# Patient Record
(demographics unavailable — no encounter records)

---

## 2018-01-04 NOTE — DIAGNOSTIC IMAGING REPORT
PROCEDURE: CHEST SINGLE (PORTABLE)

COMPARISON: None.

INDICATIONS: SHORTNESS OF BREATH

 

FINDINGS:



Lungs are well-inflated. No focal consolidation, pleural effusion, or 

pneumothorax. Cardiomediastinal contour and pulmonary vasculature are 

within normal limits when accounting for portable AP technique. No 

acute osseous abnormality.

 

CONCLUSION:

No acute cardiopulmonary abnormality. 

For description of bilateral pulmonary emboli refer to CT subsequently 

performed CT scan of the chest with contrast also from 1/4/2018. 

 

Dictated by:  Elton Ellis M.D. on 1/04/2018 at 11:22     

Electronically approved by:  Elton Ellis M.D. on 1/04/2018 at 11:22

## 2018-01-04 NOTE — DIAGNOSTIC IMAGING REPORT
PROCEDURE:

CT scan of the chest  WITH intravenous contrast, using pulmonary 

embolus protocol.

 

TECHNIQUE:

The chest was scanned utilizing a multidetector helical scanner from 

the lung apex through the level of the adrenal glands after the IV 

administration of 100 cc of Isovue 370.  Coronal and sagittal 

multiplanar reformations were obtained.

 

COMPARISON: None.

 

INDICATIONS:  r/o pe

    

FINDINGS: Examination is limited secondary to patient body habitus with 

beam hardening artifact secondary to multiple contact points with the 

scanning gantry.

 

Vasculature: Multiple filling defects extend from the branch point of 

the left main pulmonary artery into upper and lower lobe segmental 

branches. Similar findings are present in the upper, lower and middle 

lobe branches of the right pulmonary arterial tree. Pulmonary outflow 

tract is at the upper limits of normal in caliber. There is right 

ventricular dilatation.

 

There is no ectasia or aneurysmal dilatation of the thoracic aorta.

 

Lungs and Airways: No air space consolidation, bronchiectasis, or 

fibrotic changes.

 

Pleura: No pleural effusion or pneumothorax.

 

Heart and mediastinum: The visualized portions of the thyroid gland 

appear normal. No axillary, hilar, or mediastinal lymphadenopathy.

 

Soft tissues: Grossly unremarkable.

 

Abdomen: Visualized portions of the liver and spleen are unremarkable. 

Remainder of the upper abdomen is poorly evaluated.

 

Bones: No osseous destructive lesions. Multilevel degenerative disc 

changes of the thoracic spine.

 

IMPRESSION: 

 

Bilateral acute pulmonary emboli involving lobar and segmental branches 

of the left upper and lower lobes, and right upper, middle, and lower 

lobes. Associated right ventricular dilatation suggestive of right 

heart strain.

 

Findings were discussed with Dr. Mcclellan in the emergency center at 

11:10 AM 1/4/2018.

 

 

Dictated by:  Elton Ellis M.D. on 1/04/2018 at 11:21     

Electronically approved by:  Elton Ellis M.D. on 1/04/2018 at 11:21

## 2018-01-04 NOTE — CONSULTATION
DATE OF CONSULTATION:  January 04, 2018 



PULMONARY/CRITICAL CARE CONSULTATION 



PRIMARY CARE PHYSICIAN:  Dr. Paul Woods



REASON FOR CONSULTATION:  Acute pulmonary embolism.



CHIEF COMPLAINTS:  Shortness of breath. 



HPI:  Ms. Newton is a 47-year-old female.  She is a regular patient of Dr. Mcmillan.  She presented to the emergency room with worsening shortness of 

breath for the last 3 days.  She was getting short of breath on walking 

short distances in and around the house, which is something new for her.  

She went to see Dr. Mcmillan in the office where she became hypoxic, and she 

was transferred to the emergency room.  She reported episodes of dizziness, 

near syncope and numbness and tingling in her arm as well.  She denies any 

chest pain, nausea, vomiting, diarrhea or focal weakness.



REVIEW OF SYSTEMS

GENERAL:  Denies any fever or chills. 

HEENT:  Denies any head trauma or head injury.  

ENT:  Denies any earache, nosebleed, throat pain.  

CVS:  Denies any chest pain.  

RESPIRATORY:  As in HPI.

GI:  Denies any nausea or vomiting.  

OTHER:  The rest of the review of systems are negative except as in HPI.



PAST MEDICAL HISTORY:  Hypertension.



PAST SURGICAL HISTORY:  Knee replacement a year ago.



FAMILY AND SOCIAL HISTORY:  She is a lifelong nonsmoker.  Does not drink.  

Denies any family history of blood clots.



PHYSICAL EXAMINATION

VITAL SIGNS:  Temperature 98.6, pulse of 96, blood pressure 136/91, 

respiratory rate 18 to 20 per minute.  O2 sat is 96% on 4 L nasal cannula.  



SKIN:  Warm and dry. 

GENERAL APPEARANCE:  Morbidly obese, awake and alert, following commands.

HEENT:  Head is atraumatic and normocephalic.  Pupils are reactive.  

NECK:  Supple.  No JVD.  Thyroid not enlarged. 

CHEST:  Clear to auscultation bilaterally.  No wheezing.  No crackles. 

HEART:  S1, S2 audible. 

ABDOMEN:  Soft.  Nontender and nondistended. 

EXTREMITIES:  Trace pedal edema.  

NEUROLOGIC:  Awake and alert, following commands, responding to questions 

appropriately. 



CT OF THE CHEST:  I reviewed the films.  Patient has large pulmonary 

embolism, which is bilateral, involving the lobar branches and subsegmental 

branches.  



ECHOCARDIOGRAM:  RV strain.  



EKG:  S1Q3T3 pattern. 



LABS:  White count 9.7, hemoglobin 14.8, platelets 225.  Chemistries:  

Sodium 141, potassium 4.1, chloride 109, BUN 13, creatinine 0.8.  .  

Troponin is 0.250.



ASSESSMENT AND PLAN:  Roxane Newton is a 47-year-old female who presented 

with worsening shortness of breath.  CT is showing evidence of pulmonary 

embolism.  No recent travel.  No history of malignancy.  No recent trauma 

or hospitalization.  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  No family 

history of blood clots as well.



CURRENT PROBLEMS

1. Acute pulmonary embolism.

2. Acute hypoxic respiratory failure secondary to pulmonary embolism.

3. Right ventricular strain secondary to pulmonary embolism.



PLAN

1. I will continue the patient on heparin as started.  

2. At this point, the patient is hemodynamically stable.  Blood pressure 

and heart rate have been stable, so no indication for thrombolytics.  

However, respiratory status is precarious.  Recommend observing the 

patient in ICU.  Discussed with the ER physician, also discussed with 

Dr. Woods.



cc time 50 min



Thank you for this consult.  



 





DD:  01/04/2018 12:48

DT:  01/04/2018 14:20

Job#:  [id number] KACEY THIBODEAUX

## 2018-01-05 NOTE — DIAGNOSTIC IMAGING REPORT
EXAMINATION:  CHEST SINGLE (PORTABLE)    



INDICATION:           Pulmonary embolism 



COMPARISON:  None

     

FINDINGS:

TUBES and LINES:  None.



LUNGS:  Lungs are well inflated.  Lungs are clear.   There is no evidence of

pneumonia or pulmonary edema.



PLEURA:  No pleural effusion or pneumothorax.



HEART AND MEDIASTINUM:  The cardiomediastinal silhouette is unremarkable.    



BONES AND SOFT TISSUES:  No acute osseous lesion.  Soft tissues are

unremarkable.



UPPER ABDOMEN: No free air under the diaphragm.    



IMPRESSION: 

No acute thoracic abnormality.





Signed by: Dr. García Hull M.D. on 1/5/2018 7:04 AM

## 2018-01-12 NOTE — CONSULTATION
DATE OF CONSULTATION:  January 08, 2018



REQUESTING PHYSICIAN:  Dr. Warren Woods.



HISTORY OF PRESENT ILLNESS:  Roxane John is a 47-year-old white female 

who had an unprovoked pulmonary embolus, subsequently admitted for further 

evaluation, history of having had shortness of breath for approximately 3 

days prior to seeing Dr. Mcmillan, subsequently was found to be hypoxic, came 

to the emergency room and subsequently admitted for further evaluation and 

treatment.



PAST MEDICAL ILLNESSES:  History of hypertension.



SURGICAL HISTORY:  She has knee replacement a year back.



SOCIAL HISTORY:  Nonsmoker.



FAMILY HISTORY:  Noncontributory.



ALLERGIES:  REPORTED NONE.



MEDICATIONS

At this time:

1. Sodium chloride.

2. Lisinopril.

3. Weight-based heparin protocol.



REVIEW OF SYSTEMS

HEENT:  Normal.

CARDIAC:  History of hypertension.

RESPIRATORY:  Now pulmonary embolus.

GI:  Normal.

:  Normal.

MUSCULOSKELETAL:  History of knee replacement a year back.

NEUROENDOCRINE:  Essentially normal.



PHYSICAL EXAMINATION

GENERAL:  A morbidly obese female.

NECK:  No adenopathy.

HEART:  Within normal limits.

LUNGS:  Clear.

BREASTS:  Deferred.

ABDOMEN:  Obese.  There is no hepatosplenomegaly.

RECTAL:  Deferred.

VAGINAL:  Deferred.

CENTRAL NERVOUS SYSTEM:  Essentially normal.

EXTREMITIES:  Scar of the knee replacement.



LABORATORY DATA:  Shows a hemoglobin of 14.8, hematocrit 45, white count of 

9700, platelets of 225,000.  Chemistry:  Sodium of 141, potassium of 4.1, 

chloride 79, CO2 of 22, BUN 13, creatinine 0.8, glucose 119, calcium 9.7, 

bilirubin 0.5.  SGOT 13, SGPT 16, alkaline phosphatase 72.  BNP high at 

332.  Low total proteins of 6.1, low albumin of 3, globulin is 3.1.



IMAGING:  Confirmed CT of the chest on 01/04/2018 to show bilateral acute 

pulmonary emboli left upper lobe, lower lobe, right upper lobe, right 

middle lobe, and right lower lobe.



IMPRESSION

1. Pulmonary embolus, unprovoked.

2. Hypoproteinemia (6.1).

3. Hypoalbuminemia (3.1).

4. Hypertension.

5. History of endocervical polyp excised on 11/14/2016.

6. Morbid obesity.



PLAN, COMMENTS AND SUGGESTIONS

1. Suggest to add Coumadin.

2. Since this is unprovoked, at least the  literature is to keep 

these patients on "long-term" anticoagulation; however, the patient 

could be given 6 months of aggressive anticoagulation, keep the INR 

between 2 and 3, take the Coumadin off for 1 month, do a thorough 

thrombophilia profile.  If she does have thrombophilia, she does qualify 

for lifetime anticoagulation.



I certainly suggest long-term anticoagulation in this lady because this was 

an unprovoked massive pulmonary emboli.



Thank you very much for allowing me to participate in management of this 

patient.  I will follow this patient during this hospitalization.







DD:  01/12/2018 09:45

DT:  01/12/2018 10:25

Job#:  [id number]



cc:MD Ronald Gleason MD

## 2018-02-13 NOTE — DIAGNOSTIC IMAGING REPORT
#[id number] - MGSCRBIL

#BILATERAL FIRST EVER DIGITAL SCREENING MAMMOGRAM WITH CAD: 1/29/2018

CLINICAL: Routine screening.  Baseline exam.  



No prior exams were available for comparison.  Current study contains 8 films.  

The tissue of both breasts is predominantly fatty.  

Current study was also evaluated with a Computer Aided Detection (CAD) system.  

There are benign calcifications in the left breast.  

No significant masses, calcifications, or other findings are seen in either breast.  



IMPRESSION: BENIGN

There is no mammographic evidence of malignancy.  A 1 year screening mammogram is recommended. 

The patient will be notified by letter of the results.  





Guzman Monarchchanell regalado/hodan:2/12/2018 07:54:45  



Imaging Technologist: Tomasa MORGAN)(M), [facility]

letter sent: Normal Exam  

Mammogram BI-RADS: 2 Benign

## 2019-10-29 NOTE — NUR
bedside report received from Renny MILLER RN. Alert oriented and appropriate, PERRLA, respirations 
even and unlabored to room air. Pulses x4 extremities equal and strong. + neurovascular 
function to right hand present. TR band present. Cap fill brisk < 3 sec. 



Skin warm and dry integrity appears intact. IV 22g to left hand  presents healthy w/o s/s of 
infiltration or complaint. Abdomen soft and supple. pt offered toileting, denies need to 
urinate or defecate. No personal affects with patient.  Family at bedside. Pt and family 
verbalizes understanding of DC requirements and teaching. Placed on bedside monitor ACU 8.



Currently w/o complaint of pain or need. bed low and locked , side rails up x2 , call light 
within reach.

## 2019-10-29 NOTE — NUR
TR band successfully removed w/o incident. 7Fr sheath removed form Right AC . Discharge 
teaching performed and repeated back. VS wnl. No acute changes. from baseline

## 2019-10-29 NOTE — NUR
Pt meets DC criteria. right radial assessed for s/s of complication and presence of 
hematoma. Overall skin warm, dry, no discolor, and pulses present. IV removed from left 
hand. Distal tip appears intact. VS WNL. Pt denies pain, sob, or need at this time. Family 
at bedside. Review of discharge paperwork and follow up instructions. wrist brace reinforced 
removal next day with dressing. verbalized understanding. Pt to wheelchair and transported 
to front of hospital. Transferred to private vehicle under own strength w/o incident with DC 
paperwork in hand. - cgf

## 2019-10-29 NOTE — XMS REPORT
Patient Summary Document

                             Created on: 10/29/2019



JAVIER REY

External Reference #: [id number]

: 1970

Sex: Female



Demographics







                          Address                   [address] [place] [address]

 

                          Home Phone                [phone]

 

                          Preferred Language        Unknown

 

                          Marital Status            Unknown

 

                          Ep[employer]alian Affiliation     Unknown

 

                          Race                      Unknown

 

                                        Additional Race(s)  

 

                          Ethnic Group              Unknown





Author







                          Author                    [facility]

 

                          Address                   Unknown

 

                          Phone                     Unavailable







Care Team Providers







                    Care Team Member Name    Role                Phone

 

                    HERI FORTUNE       Unavailable         Unavailable

 

                    FLAVIA BARTLETT    Unavailable         Unavailable







Problems

This patient has no known problems.



Allergies, Adverse Reactions, Alerts

This patient has no known allergies or adverse reactions.



Medications

This patient has no known medications.



Results







           Test Description    Test Time    Test Comments    Text Results    Atomic Results    Result

 Comments

 

                MAMMOGRAPHY DIGITAL SCR BILAT                                        [place] [address]      Patient Name: 
JAVIER REY   MR #: [id number]    : 1970 Age/Sex: 47/F  Acct #: 
[id number] Req #: [id number]  Adm Physician:     Ordered by: HERI FORTUNE DO
 Report #: [id number]   Location: MAMMO  Room/Bed:     
_________________________________________________________________________
__________________________    Procedure: [id number] MG/MAMMOGRAPHY DIGITAL SCR 
BILAT  Exam Date: 18                            Exam Time: 1030       
REPORT STATUS: Signed       #[id number] - [id number]   #BILATERAL FIRST EVER 
DIGITAL SCREENING MAMMOGRAM WITH CAD: 2018   CLINICAL: Routine screening.  
Baseline exam.        No prior exams were available for comparison.  Current 
study contains 8 films.     The tissue of both breasts is predominantly fatty.  
  Current study was also evaluated with a Computer Aided Detection (CAD) system.
    There are benign calcifications in the left breast.     No significant 
masses, calcifications, or other findings are seen in either breast.        
IMPRESSION: BENIGN   There is no mammographic evidence of malignancy.  A 1 year 
screening mammogram is recommended.    The patient will be notified by letter of
the results.           Tom regalado/jesus:2018 
07:54:45        Imaging Technologist: Tomasa MORGAN)(ROSETTA), [facility]   letter sent: Normal Exam     Mammogram BI-RADS: 2 
Benign     Dictated By: TOM RAY DO  Electronically Signed By: TOM RAY DO on 18  Transcribed By: JESUS on 18       COPY 
TO:   HERI FORTUNE DO                     

 

                CHEST SINGLE (PORTABLE)                                        [place] [address]      Patient Name: 
JAVIER REY   MR #: [id number]    : 1970 Age/Sex: 47/F  Acct #: 
[id number] Req #: [id number]  Adm Physician: FLAVIA BARTLETT MD    Ordered by: 
WOJCIECH GARCIA NP  Report #: [id number]   Location: ICU  Room/Bed: ICU [facility]  
 ______________________________________________
_____________________________________________________    Procedure: [id number] 
DX/CHEST SINGLE (PORTABLE)  Exam Date: 18                            Exam 
Time: 0620       REPORT STATUS: Signed    EXAMINATION:  CHEST SINGLE (PORTABLE) 
        INDICATION:           Pulmonary embolism       COMPARISON:  None        
  FINDINGS:   TUBES and LINES:  None.      LUNGS:  Lungs are well inflated.  
Lungs are clear.   There is no evidence of   pneumonia or pulmonary edema.      
PLEURA:  No pleural effusion or pneumothorax.      HEART AND MEDIASTINUM:  The 
cardiomediastinal silhouette is unremarkable.          BONES AND SOFT TISSUES:  
No acute osseous lesion.  Soft tissues are   unremarkable.      UPPER ABDOMEN: 
No free air under the diaphragm.          IMPRESSION:    No acute thoracic 
abnormality.         Signed by: Dr. García Hull M.D. on 2018 7:04 AM       
Dictated By: GARCÍA HUDSON MD  Electronically Signed By: GARCÍA OBRIEN MD on 18  Transcribed By: SHANA on 18       COPY 
TO:   WOJCIECH GARCIA NP                

 

                CT CHEST W                                          [place] [address]      Patient Name: JAVIER REY   MR #:
[id number]    : 1970 Age/Sex: 47/F  Acct #: [id number] Req #: [id number]
[id number]  Adm Physician:     Ordered by: CONRADO GILMAN MD  Report #: [id number]  
Location: ER  Room/Bed:     
____________________________________________________________________________
_______________________    Procedure: [id number] CT/CT CHEST W  Exam Date: 
18                            Exam Time: 1040       REPORT STATUS: Signed 
  PROCEDURE:   CT scan of the chest  WITH intravenous contrast, using pulmonary 
  embolus protocol.       TECHNIQUE:   The chest was scanned utilizing a 
multidetector helical scanner from    the lung apex through the level of the 
adrenal glands after the IV    administration of 100 cc of Isovue 370.  Coronal 
and sagittal    multiplanar reformations were obtained.       COMPARISON: None. 
     INDICATIONS:  r/o pe          FINDINGS: Examination is limited secondary to
patient body habitus with    beam hardening artifact secondary to multiple 
contact points with the    scanning gantry.       Vasculature: Multiple filling 
defects extend from the branch point of    the left main pulmonary artery into 
upper and lower lobe segmental    branches. Similar findings are present in the 
upper, lower and middle    lobe branches of the right pulmonary arterial tree. 
Pulmonary outflow    tract is at the upper limits of normal in caliber. There is
right    ventricular dilatation.       There is no ectasia or aneurysmal 
dilatation of the thoracic aorta.       Lungs and Airways: No air space 
consolidation, bronchiectasis, or    fibrotic changes.       Pleura: No pleural 
effusion or pneumothorax.       Heart and mediastinum: The visualized portions 
of the thyroid gland    appear normal. No axillary, hilar, or mediastinal 
lymphadenopathy.       Soft tissues: Grossly unremarkable.       Abdomen: 
Visualized portions of the liver and spleen are unremarkable.    Remainder of 
the upper abdomen is poorly evaluated.       Bones: No osseous destructive 
lesions. Multilevel degenerative disc    changes of the thoracic spine.       
IMPRESSION:        Bilateral acute pulmonary emboli involving lobar and 
segmental branches    of the left upper and lower lobes, and right upper, 
middle, and lower    lobes. Associated right ventricular dilatation suggestive 
of right    heart strain.       Findings were discussed with Dr. Gilman in the
emergency center at    11:10 AM 2018.           Dictated by:  Capo Barnhart M.D. on 2018 at 11:21        Electronically approved by:  Capo Barnhart M.D. 
on 2018 at 11:21                Dictated By: CAPO BARNHART MD  
Electronically Signed By: CAPO BARNHART MD on 18 1121  Transcribed By: 
CHRISTIANO on 18 1121       COPY TO:   CONRADO GILMAN MD             

 

                CHEST SINGLE (PORTABLE)                                        [place] [address]      Patient Name: 
JAVIER REY   MR #: [id number]    : 1970 Age/Sex: 47/F  Acct #: 
[id number] Req #: [id number]  Adm Physician:     Ordered by: CONRADO GILMAN MD
 Report #: [id number]   Location: ER  Room/Bed:     
____________________________________________________________________________
_______________________    Procedure: [id number] DX/CHEST SINGLE (PORTABLE)  Exam
Date: 18                            Exam Time: 1100       REPORT STATUS: 
Signed    PROCEDURE: CHEST SINGLE (PORTABLE)   COMPARISON: None.   INDICATIONS: 
SHORTNESS OF BREATH       FINDINGS:         Lungs are well-inflated. No focal 
consolidation, pleural effusion, or    pneumothorax. Cardiomediastinal contour 
and pulmonary vasculature are    within normal limits when accounting for 
portable AP technique. No    acute osseous abnormality.       CONCLUSION:      
No acute cardiopulmonary abnormality.    For description of bilateral pulmonary 
emboli refer to CT subsequently    performed CT scan of the chest with contrast 
also from 2018.        Dictated by:  Capo Barnhart M.D. on 2018 at 11:22 
      Electronically approved by:  Capo Barnhart M.D. on 2018 at 11:22       
        Dictated By: CAPO BARNHART MD  Electronically Signed By: CAPO BARNHART MD on 18 1122  Transcribed By: CHRISTIANO on 18 1122       COPY TO:   
CONRADO GILMAN MD

## 2019-10-30 NOTE — OPERATIVE REPORT
DATE OF PROCEDURE:  10/29/2019

 

SURGEON:  German Dobbs MD

 

CARDIAC CATH LAB PROCEDURE NOTE

 

INDICATION:  

1. Coronary artery disease, abnormal stress test.

2. Congestive heart failure.

 

PROCEDURES PERFORMED:  

1. Left heart catheterization, selective coronary angiography.

2. Balloon flotation right heart catheterization.

3. Deployment right wrist TR band.

 

COMPLICATIONS:  None.

 

RECOMMENDATIONS:  Medical therapy.

 

DESCRIPTION OF PROCEDURE:  Access obtained in the right radial artery using ultrasound

guidance.  A 5-[place] sheath was placed.  Access obtained in the right cephalic vein

using ultrasound guidance, seven-Congolese sheath was placed.  Balloon flotation right

heart catheterization revealed normal findings including pulmonary artery pressure 38/12

with a mean of 15 mmHg.  Pulmonary capillary wedge pressure 11 mmHg.  Right atrial

pressure 10 mmHg.  Cardiac output 6.4 L a minute.  LV end-diastolic pressure of 12 mmHg. 

 

Coronary angiography was performed with demonstrating minimal coronary artery disease,

10% luminal stenosis diffusely.  No critical stenosis or occlusions.  No intervention

was deemed necessary.  Right wrist TR band applied.  Seven-Congolese sheath under manual

pressure.  The patient discharged home same day. 

 

 

 

 

______________________________

German Dobbs MD

 

KSB/MODL

D:  10/30/2019 08:53:05

T:  10/30/2019 15:37:42

Job #:  [id number]

## 2019-11-18 NOTE — OPERATIVE REPORT
DATE OF PROCEDURE:  10/29/2019

 

SURGEON:  German Dobbs MD

 

INDICATIONS:  Coronary artery disease and congestive heart failure.

 

PROCEDURES PERFORMED:  

1. Left heart catheterization, selective coronary angiography.

2. Right heart catheterization.

 

COMPLICATIONS:  None.

 

RECOMMENDATIONS:  Medical therapy.

 

DESCRIPTION OF PROCEDURE:  Access obtained in the right radial artery.  A 5-Canadian

sheath was placed.  Access obtained in the right cephalic vein.  A 7-[place] sheath was

placed.  Balloon flotation [place] catheterization demonstrated normal right heart

catheterization.  Cardiac output 6.4 L.  Mean pulmonary artery pressure of 15 mmHg. 

 

Coronary angiography demonstrated minimal coronary artery disease of 10% diffuse

stenosis.  No critical stenosis or occlusion noted.  LV end-diastolic pressure of 12.

No gradient across the 

aortic valve on pullback.  Right wrist TR band applied.  Venous sheath removed under

manual pressure.  The patient discharged home the same day. 

 

 

 

 

______________________________

German Dobbs MD

 

KSB/MODL

D:  11/18/2019 13:13:16

T:  11/18/2019 16:10:26

Job #:  [id number]